# Patient Record
Sex: FEMALE | Race: BLACK OR AFRICAN AMERICAN | NOT HISPANIC OR LATINO | Employment: UNEMPLOYED | URBAN - METROPOLITAN AREA
[De-identification: names, ages, dates, MRNs, and addresses within clinical notes are randomized per-mention and may not be internally consistent; named-entity substitution may affect disease eponyms.]

---

## 2024-06-25 PROBLEM — G40.909 SEIZURE DISORDER: Status: ACTIVE | Noted: 2024-06-25

## 2024-06-25 PROBLEM — G81.94 HEMIPLEGIA AFFECTING LEFT NONDOMINANT SIDE: Status: ACTIVE | Noted: 2024-06-25

## 2024-06-25 PROBLEM — I69.30 HISTORY OF HEMORRHAGIC CEREBROVASCULAR ACCIDENT (CVA) WITH RESIDUAL DEFICIT: Status: ACTIVE | Noted: 2024-06-25

## 2024-07-09 ENCOUNTER — PATIENT OUTREACH (OUTPATIENT)
Dept: ADMINISTRATIVE | Facility: OTHER | Age: 29
End: 2024-07-09

## 2024-07-10 NOTE — PROGRESS NOTES
CHW - Initial Contact    This Community Health Worker completed the Social Determinant of Health questionnaire with patient via telephone today.  Referral from case management for caregiver support.  Pt identified barriers of most importance are:  Housing assistance   Referrals to community agencies completed with patient/caregiver consent outside of [facility] include: n/a  Referrals were put through [facility] - : no  Support and Services: Ms. Stevens , Ms. Woods mother stated she is interested in assistance with housing for her daughter. Her daughter has three kids which she has custody of them all. Her daughter once lived by herself when they lived in another state and now that they have moved to [place], it is seven people living in a two bedroom home. Carrillo is getting annoyed with having to share her space. Rodney has applied for SNAP benefits and awaiting to hear back. She tried for the kinship program benefits due to raising many of her grand kinds bu was informed due to Chuck living in the home she can not get the benefits for her three children. She would like to find her a low income home where her two older boys can live with her ans she will visit daily like she did then they lived in the other state. I have informed Ms. Stevens to try again to apply for [place] [employer] authority since according to their online website they are accepting clients to the waiting list since February. I have also informed [place] utility assistance resources via email to Ms. Stevens . I will follow for outcome of resources.  Other information discussed the patient needs / wants help with:  utility assistance.  Follow up required: yes   Initial Outreach - Due: 7/12/2024

## 2024-07-16 ENCOUNTER — PATIENT OUTREACH (OUTPATIENT)
Dept: ADMINISTRATIVE | Facility: OTHER | Age: 29
End: 2024-07-16

## 2024-07-16 NOTE — PROGRESS NOTES
CHW - Follow Up    This Community Health Worker completed a follow up visit with patient via telephone today.  Pt/Caregiver reported: Ms. Teixeira Mother, Rodney Mother stated she has not completed the [employer] application since we spoke. She has receive my email with utility assistance. She is currently a a WIC appointment and would like a call back. She stated she will complete he [employer] authority application [employer].  Community Health Worker provided: n/a  Follow up required: yes  Follow-up Outreach - Due: 7/18/2024

## 2024-07-18 PROBLEM — F31.30 BIPOLAR AFFECTIVE DISORDER, DEPRESSED: Status: ACTIVE | Noted: 2024-07-18

## 2024-07-18 PROBLEM — F31.9 BIPOLAR AFFECTIVE DISORDER: Status: ACTIVE | Noted: 2024-07-18

## 2024-07-19 PROBLEM — F31.4 SEVERE DEPRESSED BIPOLAR I DISORDER WITHOUT PSYCHOTIC FEATURES: Status: ACTIVE | Noted: 2024-07-19

## 2024-07-24 ENCOUNTER — PATIENT OUTREACH (OUTPATIENT)
Dept: ADMINISTRATIVE | Facility: OTHER | Age: 29
End: 2024-07-24

## 2024-07-24 NOTE — PROGRESS NOTES
CHW - Outreach Attempt    Community Health Worker left a voicemail message for 1st attempt to contact caregiver regarding: wic and housing  Community Health Worker to attempt to contact caregiver on: [phone]

## 2024-07-25 PROBLEM — F31.9 BIPOLAR AND RELATED DISORDER: Status: ACTIVE | Noted: 2024-07-25

## 2024-07-26 ENCOUNTER — PATIENT OUTREACH (OUTPATIENT)
Dept: ADMINISTRATIVE | Facility: OTHER | Age: 29
End: 2024-07-26

## 2024-07-26 PROBLEM — F48.2 PSEUDOBULBAR AFFECT: Status: ACTIVE | Noted: 2024-07-26

## 2024-07-26 PROBLEM — I69.398 MOOD DISORDER DUE TO OLD STROKE: Status: ACTIVE | Noted: 2024-07-25

## 2024-07-26 PROBLEM — F02.818 MAJOR NEUROCOGNITIVE DISORDER DUE TO ANOTHER MEDICAL CONDITION WITH BEHAVIORAL DISTURBANCE: Status: ACTIVE | Noted: 2024-07-26

## 2024-07-26 PROBLEM — F48.2 PSEUDOBULBAR AFFECT: Status: RESOLVED | Noted: 2024-07-26 | Resolved: 2024-07-26

## 2024-07-26 PROBLEM — F06.30 MOOD DISORDER DUE TO OLD STROKE: Status: ACTIVE | Noted: 2024-07-25

## 2024-07-26 NOTE — PROGRESS NOTES
CHW - Case Closure    This Community Health Worker will closed Ms. Teixeira episode due to being admitted into [facility].